# Patient Record
(demographics unavailable — no encounter records)

---

## 2024-10-11 NOTE — HISTORY OF PRESENT ILLNESS
[FreeTextEntry6] : 15 year old male for follow up Hx: CPE for sports (basketball): cleared on 9/25/24 no recent illness med hx: hearing loss left ear allergy: strawberries patient has h/o sexual activity - uses condoms sometimes no complaints today

## 2024-10-23 NOTE — HISTORY OF PRESENT ILLNESS
[FreeTextEntry6] : 15 y.o male presents to [facility] for headache  Had a MRI yesterday with contract for left eye hearing lost Denies visual disturbances, N/V  Did not take anything for relief  did not eat yet or drink anything today

## 2024-10-23 NOTE — DISCUSSION/SUMMARY
[FreeTextEntry1] : 15 y.o presents to health center with generalized HA Onset today NKDA Tylenol PO given, tolerated  Counseling/education provided on health maintenance, dietary needs and hydration   snack and water provided  Answered all questions and concerns  RTC if persist/ worsen  D/C to class

## 2024-11-20 NOTE — HISTORY OF PRESENT ILLNESS
[FreeTextEntry6] : 15 y.o male presents to health center for cut to forehead  C/O burning sensation  [place] reports being scratched by a friend accidently  No additional complaints

## 2024-11-20 NOTE — DISCUSSION/SUMMARY
[FreeTextEntry1] : 15 y.o male presents to health center for small abrasion to forehead V/S stable  NKDA Bacitracin applied to area  Counseling/education provided on health maintenance and management  Answered all questions  D/C to class

## 2024-12-11 NOTE — REVIEW OF SYSTEMS
[Diarrhea] : diarrhea [Abdominal Pain] : abdominal pain [Fever] : no fever [Nasal Discharge] : no nasal discharge [Nasal Congestion] : no nasal congestion [Sore Throat] : no sore throat [Tachypnea] : not tachypneic [Wheezing] : no wheezing [Cough] : no cough [Vomiting] : no vomiting [Constipation] : no constipation

## 2024-12-11 NOTE — PHYSICAL EXAM
[Alert] : alert [EOMI] : grossly EOMI [FROM] : full passive range of motion [Clear to Auscultation Bilaterally] : clear to auscultation bilaterally [Regular Rate and Rhythm] : regular rate and rhythm [Normal S1, S2 audible] : normal S1, S2 audible [Murmur] : murmur [Soft] : soft [Moves All Extremities x 4] : moves all extremities x4 [Acute Distress] : no acute distress [Erythematous Oropharynx] : nonerythematous oropharynx [Cobblestoning] : no cobblestoning of posterior pharynx [Vesicles] : no vesicles [Exudate] : no exudate [Wheezing] : no wheezing [Tachypnea] : no tachypnea [Tender] : nontender [Distended] : nondistended [Normal Bowel Sounds] : abnormal bowel sounds [Tenderness with Palpation] : no tenderness with palpation [McBurney's point tenderness] : no McBurney's point tenderness [FreeTextEntry9] : hyperactive bowel sounds

## 2024-12-11 NOTE — HISTORY OF PRESENT ILLNESS
[FreeTextEntry6] : 15 YO M with left sided hearing loss, unspecified type, here for stomachache. Pt reports eating a bagel with cream cheese and hensley earlier in the morning around 8:40 AM and shortly afterwards developed generalized modesta-umbilical abdominal pain cramping in nature, denies any n/v at this point. Reports pain to be 10/10, however after using the bathroom reports feeling much better with pain improved to 5/10. Denies any fever Does not recall any sick contacts around him PMH: Asthma Medication: albuterol, symbicort daily Allergies: strawberries and seasonal allergies

## 2025-01-09 NOTE — HISTORY OF PRESENT ILLNESS
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

## 2025-01-09 NOTE — DISCUSSION/SUMMARY
[FreeTextEntry1] : 15 year old male with possible allergic reaction 1:15 pm dispensed Diphenhydramine 25 mg po and tolerated after several attempts - Step Mom was contacted and informed this provider that Dad was on his way Dad presents agitated with Epi Pen - current status discussed, Dad reassured that patient is stable and doing well with no signs of anaphylaxis reviewed s/s Patient and Dad verbalize understanding MAF given to be completed by PMD paitent discharged in care of Dad

## 2025-04-22 NOTE — DISCUSSION/SUMMARY
[FreeTextEntry1] : 15 year old male for medical certificate: issued Imm record reviewed = [facility] services reviewed patient verbalizes understanding to f/u as needed

## 2025-04-22 NOTE — HISTORY OF PRESENT ILLNESS
[FreeTextEntry6] : 15 year old male requests medical certificate Hx: CPE on 9/25/24- cleared for sports at that time allergy:strawberries no recent illness no complaints today

## 2025-05-21 NOTE — HISTORY OF PRESENT ILLNESS
[de-identified] : 15 y.o. male here for headache.  Ate a hensley, egg and cheese and drinking fluid this AM.  COSTELLO started second period.  On nomeds.  NKDA.  Allergic to strawberries.